# Patient Record
Sex: FEMALE | Race: WHITE | NOT HISPANIC OR LATINO
[De-identification: names, ages, dates, MRNs, and addresses within clinical notes are randomized per-mention and may not be internally consistent; named-entity substitution may affect disease eponyms.]

---

## 2020-09-02 PROBLEM — Z00.00 ENCOUNTER FOR PREVENTIVE HEALTH EXAMINATION: Status: ACTIVE | Noted: 2020-09-02

## 2020-09-15 ENCOUNTER — APPOINTMENT (OUTPATIENT)
Dept: POPULATION HEALTH | Facility: CLINIC | Age: 30
End: 2020-09-15
Payer: MEDICAID

## 2020-09-15 ENCOUNTER — TRANSCRIPTION ENCOUNTER (OUTPATIENT)
Age: 30
End: 2020-09-15

## 2020-09-15 DIAGNOSIS — Z77.098 CONTACT WITH AND (SUSPECTED) EXPOSURE TO OTHER HAZARDOUS, CHIEFLY NONMEDICINAL, CHEMICALS: ICD-10-CM

## 2020-09-15 DIAGNOSIS — M06.9 RHEUMATOID ARTHRITIS, UNSPECIFIED: ICD-10-CM

## 2020-09-15 DIAGNOSIS — Z82.3 FAMILY HISTORY OF STROKE: ICD-10-CM

## 2020-09-15 PROCEDURE — 99204 OFFICE O/P NEW MOD 45 MIN: CPT | Mod: 95

## 2020-09-15 NOTE — ASSESSMENT
[FreeTextEntry1] : Ms. Blair is a 31 yo woman with a h/o RA/ankylosing spondylitis and high cholesterol who has had exposure to PFAS chemicals while living in [place].\par \par PFAS exposure has been established to result in increased risk of the following health effects:\par -hepatocellular injury\par -alterations in cholesterol metabolism\par -alterations in thyroid function\par -alterations in androgen levels\par -fertility and gestational issues\par -alterations in antibody levels\par -alterations in uric acid\par -cancers: kidney and testicular are the best studied, but evidence exists for other cancers as well.\par \par PFAS exposure and its health effects are further compounded by the fact that PFHxS has a half-life in the body of between 5 and 36 years, PFOS is between 3 and 27 years, and PFOA has a half-life of betwenn 2-10 years.\par \par Given the long half-life she is still at risk for the non-cancer outcomes as well as the elevated risk of related cancers, which will remain throughout her life\par \par I had extensive discussion with her about all of the aove.  All questions were answered.  I advised her that vigilance about her health, rather than anxiety or panic, is warranted.\par \par She understood and agreed with the plan.\par RTC in 1 yr unless clinical or exposure history change.

## 2020-09-15 NOTE — HISTORY OF PRESENT ILLNESS
[FreeTextEntry1] : 31 yo woman here for evaluation of exposure to PFAS chemicals in [facility]\par \par She was born in [place] and lived with her mom in [place] on weekdays and her dad in [place] on weekends.  \par \par In [place] lived in a house that was supplied by well water.  Drank tap water.\par Went to school in [place] until 10th grade.  \par Drank tap water at school. 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  Drinks combination of filtered water and bottled water now.  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 Does [employer].\par \par Hobbies:\par not much time, cares for 8 year old son.\par \par PMHx:\par was healthy until 2014\par 2014 diagnosed with RA, diagnosis changed to possible lupus. In 2018 changed dx to ankylosing spondylitis.\par has had high cholesterol - managed by diet.\par \par Medications:\par hasn’t been on meds since February because loss to follow up with pandemic; supposed to start an infusion drug soon.\par \par All: NKDA\par \par Soc: no tobacco hx\par second hand smoke from mother growing up\par \par FHx:\par mother - mini stroke, smoker\par father - healthy\par brother - healthy\par son - healthy, was born full term.\par \par

## 2022-05-17 ENCOUNTER — RESULT REVIEW (OUTPATIENT)
Age: 32
End: 2022-05-17